# Patient Record
Sex: FEMALE | Race: WHITE | NOT HISPANIC OR LATINO | URBAN - NONMETROPOLITAN AREA
[De-identification: names, ages, dates, MRNs, and addresses within clinical notes are randomized per-mention and may not be internally consistent; named-entity substitution may affect disease eponyms.]

---

## 2017-11-08 RX ORDER — ALBUTEROL SULFATE 90 UG/1
2 AEROSOL, METERED RESPIRATORY (INHALATION) EVERY 4 HOURS PRN
COMMUNITY

## 2017-11-08 RX ORDER — AZELASTINE 1 MG/ML
2 SPRAY, METERED NASAL 2 TIMES DAILY
COMMUNITY

## 2017-11-08 RX ORDER — LANOLIN ALCOHOL/MO/W.PET/CERES
1000 CREAM (GRAM) TOPICAL DAILY
COMMUNITY

## 2017-11-08 RX ORDER — OMEPRAZOLE 20 MG/1
20 CAPSULE, DELAYED RELEASE ORAL DAILY
COMMUNITY

## 2017-11-08 RX ORDER — HYDROCODONE BITARTRATE AND ACETAMINOPHEN 5; 325 MG/1; MG/1
1 TABLET ORAL EVERY 6 HOURS PRN
COMMUNITY

## 2017-11-08 RX ORDER — POTASSIUM CHLORIDE 1.5 G/1.77G
20 POWDER, FOR SOLUTION ORAL 2 TIMES DAILY
COMMUNITY

## 2017-11-08 RX ORDER — FAMOTIDINE 40 MG/1
40 TABLET, FILM COATED ORAL DAILY
COMMUNITY

## 2017-11-08 RX ORDER — DOXEPIN HYDROCHLORIDE 75 MG/1
75 CAPSULE ORAL NIGHTLY
COMMUNITY

## 2017-11-08 RX ORDER — AMLODIPINE BESYLATE 5 MG/1
5 TABLET ORAL DAILY
COMMUNITY

## 2017-11-08 RX ORDER — CARVEDILOL 12.5 MG/1
12.5 TABLET ORAL DAILY
COMMUNITY

## 2017-11-08 RX ORDER — HYDROXYZINE HYDROCHLORIDE 25 MG/1
25 TABLET, FILM COATED ORAL 3 TIMES DAILY PRN
COMMUNITY

## 2017-11-08 RX ORDER — CLONAZEPAM 0.5 MG/1
0.5 TABLET ORAL 3 TIMES DAILY PRN
COMMUNITY

## 2017-11-08 RX ORDER — RANITIDINE 150 MG/1
150 TABLET ORAL 2 TIMES DAILY
COMMUNITY

## 2017-11-09 ENCOUNTER — OFFICE VISIT (OUTPATIENT)
Dept: NEUROSURGERY | Facility: CLINIC | Age: 57
End: 2017-11-09

## 2017-11-09 VITALS
OXYGEN SATURATION: 96 % | TEMPERATURE: 98.2 F | DIASTOLIC BLOOD PRESSURE: 88 MMHG | WEIGHT: 177 LBS | SYSTOLIC BLOOD PRESSURE: 132 MMHG | BODY MASS INDEX: 31.36 KG/M2 | HEART RATE: 72 BPM | RESPIRATION RATE: 16 BRPM | HEIGHT: 63 IN

## 2017-11-09 DIAGNOSIS — G89.29 CHRONIC BILATERAL LOW BACK PAIN WITHOUT SCIATICA: Primary | ICD-10-CM

## 2017-11-09 DIAGNOSIS — M47.817 LUMBOSACRAL SPONDYLOSIS WITHOUT MYELOPATHY: ICD-10-CM

## 2017-11-09 DIAGNOSIS — M54.50 CHRONIC BILATERAL LOW BACK PAIN WITHOUT SCIATICA: Primary | ICD-10-CM

## 2017-11-09 DIAGNOSIS — M48.061 STENOSIS OF LATERAL RECESS OF LUMBAR SPINE: ICD-10-CM

## 2017-11-09 PROCEDURE — 99204 OFFICE O/P NEW MOD 45 MIN: CPT | Performed by: NEUROLOGICAL SURGERY

## 2017-11-09 RX ORDER — HYDROCODONE BITARTRATE AND ACETAMINOPHEN 7.5; 325 MG/1; MG/1
1 TABLET ORAL EVERY 6 HOURS PRN
COMMUNITY

## 2017-11-09 RX ORDER — PROMETHAZINE HYDROCHLORIDE 25 MG/1
25 TABLET ORAL EVERY 6 HOURS PRN
COMMUNITY

## 2017-11-09 RX ORDER — NEBIVOLOL 5 MG/1
5 TABLET ORAL DAILY
COMMUNITY

## 2017-11-09 RX ORDER — HYDROCHLOROTHIAZIDE 25 MG/1
25 TABLET ORAL DAILY
COMMUNITY

## 2017-11-09 RX ORDER — FUROSEMIDE 20 MG/1
20 TABLET ORAL 2 TIMES DAILY
COMMUNITY

## 2017-11-09 NOTE — PROGRESS NOTES
Patient: Kiarra Rosales  :  1960  Chart #:  [id number]    Date of Service: 17    Chief Complaint:   Chief Complaint   Patient presents with   • Back Pain       Back Pain   Chronicity: Mrs. Rosales returns today with low back pain. The current episode started more than 1 month ago (I saw her back in  for neck and thoracic pain.  Patient presents today with low back pain.  This has been going on for the last 6 months.). Episode frequency: She has had a bilateral hip replacement. The problem has been gradually worsening (She has some stiffness in her upper extremities.) since onset. The pain is present in the lumbar spine (Patient's gait is abnormal.  She cannot hardly walk. She walks very slowly.). The quality of the pain is described as aching. The pain does not radiate. The pain is at a severity of 6/10. The pain is moderate (Her pain is moderate to severe.). Worse during: She has a tingling sensation in the left foot.  She has pain when her lower extremities are raised. The symptoms are aggravated by position and standing (Her pain increases when she is ambulatory.). Stiffness is present all day. Associated symptoms include abdominal pain and weakness. Pertinent negatives include no chest pain, dysuria, fever, headaches or numbness. Risk factors include lack of exercise and sedentary lifestyle. She has tried bed rest and analgesics (She is not able to take Ibuprofen because it hurts her stomach.) for the symptoms. The treatment provided no relief.     She denies leg pain;  She has constant low back pain; she cannot take NSAIDs due to stomach irritation.  She has tried PT without help.  She has never had spine surgery.      Radiographic Images:   MRI of the lumbar spine dated 17 shows she has normal lumbar alignment.  She has dessication of all her lumbar disc.  She has bulging at the L4-L5 disc.  She has mild lateral recess stenosis at L4 on L5 on the right.  There is no central spinal  stenosis or disc herniations.    Past Medical History:   Diagnosis Date   • Anxiety    • Chronic kidney disease     chronic - stage 3   • Colitis    • COPD (chronic obstructive pulmonary disease)    • Gastroenteritis    • H/O diarrhea    • Headache    • Heart disease     atherosclerotic   • Hyperglycemia    • Hypertension    • Hypokalemia    • Hypokalemia    • Low back pain    • LOUIS (nonalcoholic steatohepatitis)    • Osteoarthritis    • SOB (shortness of breath)    • Vitamin B12 deficiency      Current Outpatient Prescriptions   Medication Sig Dispense Refill   • clonazePAM (KlonoPIN) 0.5 MG tablet Take 0.5 mg by mouth 3 (Three) Times a Day As Needed for Seizures.     • doxepin (SINEquan) 75 MG capsule Take 75 mg by mouth Every Night.     • famotidine (PEPCID) 40 MG tablet Take 40 mg by mouth Daily.     • furosemide (LASIX) 20 MG tablet Take 20 mg by mouth 2 (Two) Times a Day.     • hydrochlorothiazide (HYDRODIURIL) 25 MG tablet Take 25 mg by mouth Daily.     • HYDROcodone-acetaminophen (NORCO) 7.5-325 MG per tablet Take 1 tablet by mouth Every 6 (Six) Hours As Needed for Moderate Pain .     • hydrOXYzine (ATARAX) 25 MG tablet Take 25 mg by mouth 3 (Three) Times a Day As Needed for Itching.     • nebivolol (BYSTOLIC) 5 MG tablet Take 5 mg by mouth Daily.     • promethazine (PHENERGAN) 25 MG tablet Take 25 mg by mouth Every 6 (Six) Hours As Needed for Nausea or Vomiting.     • raNITIdine (ZANTAC) 150 MG tablet Take 150 mg by mouth 2 (Two) Times a Day.     • albuterol (PROVENTIL HFA;VENTOLIN HFA) 108 (90 Base) MCG/ACT inhaler Inhale 2 puffs Every 4 (Four) Hours As Needed for Wheezing.     • amLODIPine (NORVASC) 5 MG tablet Take 5 mg by mouth Daily.     • azelastine (ASTELIN) 0.1 % nasal spray 2 sprays into each nostril 2 (Two) Times a Day. Use in each nostril as directed     • carvedilol (COREG) 12.5 MG tablet Take 12.5 mg by mouth Daily.     • HYDROcodone-acetaminophen (NORCO) 5-325 MG per tablet Take 1 tablet by  mouth Every 6 (Six) Hours As Needed.     • magnesium oxide (MAGOX) 400 (241.3 Mg) MG tablet tablet Take 400 mg by mouth Daily.     • omeprazole (priLOSEC) 20 MG capsule Take 20 mg by mouth Daily.     • potassium chloride (KLOR-CON) 20 MEQ packet Take 20 mEq by mouth 2 (Two) Times a Day.     • vitamin B-12 (CYANOCOBALAMIN) 1000 MCG tablet Take 1,000 mcg by mouth Daily.       No current facility-administered medications for this visit.       Allergies   Allergen Reactions   • Latex    • Lyrica [Pregabalin]    • Morphine And Related      Social History     Social History   • Marital status: Unknown     Spouse name: N/A   • Number of children: N/A   • Years of education: N/A     Social History Main Topics   • Smoking status: Current Every Day Smoker     Packs/day: 1.00   • Smokeless tobacco: Never Used   • Alcohol use No   • Drug use: No   • Sexual activity: Defer     Other Topics Concern   • None     Social History Narrative     Family History   Problem Relation Age of Onset   • Cancer Mother      Past Surgical History:   Procedure Laterality Date   •  SECTION     • CHOLECYSTECTOMY     • HIP SURGERY       Review of Systems   Constitutional: Positive for activity change, appetite change and unexpected weight change. Negative for chills, diaphoresis, fatigue and fever.   HENT: Negative for congestion, dental problem, drooling, ear discharge, ear pain, facial swelling, hearing loss, mouth sores, nosebleeds, postnasal drip, rhinorrhea, sinus pressure, sneezing, sore throat, tinnitus, trouble swallowing and voice change.    Eyes: Positive for photophobia. Negative for pain, discharge, redness, itching and visual disturbance.   Respiratory: Negative for apnea, cough, choking, chest tightness, shortness of breath, wheezing and stridor.    Cardiovascular: Positive for leg swelling. Negative for chest pain and palpitations.   Gastrointestinal: Positive for abdominal pain, diarrhea, nausea and vomiting. Negative for  "abdominal distention, anal bleeding, blood in stool, constipation and rectal pain.   Endocrine: Positive for polydipsia. Negative for cold intolerance, heat intolerance, polyphagia and polyuria.   Genitourinary: Negative for decreased urine volume, difficulty urinating, dysuria, enuresis, flank pain, frequency, genital sores, hematuria and urgency.   Musculoskeletal: Positive for back pain. Negative for arthralgias, gait problem, joint swelling, myalgias, neck pain and neck stiffness.   Skin: Negative for color change, pallor, rash and wound.   Allergic/Immunologic: Positive for food allergies. Negative for environmental allergies and immunocompromised state.   Neurological: Positive for weakness. Negative for dizziness, tremors, seizures, syncope, facial asymmetry, speech difficulty, light-headedness, numbness and headaches.   Hematological: Negative for adenopathy. Does not bruise/bleed easily.   Psychiatric/Behavioral: Positive for dysphoric mood. Negative for agitation, behavioral problems, confusion, decreased concentration, hallucinations, self-injury, sleep disturbance and suicidal ideas. The patient is nervous/anxious. The patient is not hyperactive.    All other systems reviewed and are negative.    Vitals:    11/09/17 1456   BP: 132/88   BP Location: Left arm   Patient Position: Sitting   Pulse: 72   Resp: 16   Temp: 98.2 °F (36.8 °C)   SpO2: 96%   Weight: 177 lb (80.3 kg)   Height: 63\" (160 cm)     Physical Exam   Constitutional: She is oriented to person, place, and time. She appears well-developed and well-nourished. No distress.   Neat healthy female   HENT:   Head: Normocephalic.   Right Ear: Hearing normal.   Left Ear: Hearing normal.   Mouth/Throat: Uvula is midline, oropharynx is clear and moist and mucous membranes are normal. She has dentures.   Eyes: Conjunctivae, EOM and lids are normal. Pupils are equal, round, and reactive to light.   Pupils 3 mm;  Fundi normal   Neck: Trachea normal. " Decreased range of motion present. No thyroid mass present.   Mild stiffness;  Shoulder ROM limited to 120 deg on R and 150 deg on L.     Cardiovascular: Regular rhythm.    Pulses:       Carotid pulses are 2+ on the right side, and 2+ on the left side.       Radial pulses are 2+ on the right side, and 2+ on the left side.        Dorsalis pedis pulses are 2+ on the right side, and 2+ on the left side.   Musculoskeletal:        Lumbar back: She exhibits decreased range of motion and pain. She exhibits no deformity and no spasm.   Moderate stiffness;  SLR increased low back pain R>L;  Hip ROM diminished bilaterally   Neurological: She is oriented to person, place, and time. She has normal strength.   Reflex Scores:       Tricep reflexes are 1+ on the right side and 1+ on the left side.       Bicep reflexes are 1+ on the right side and 1+ on the left side.       Patellar reflexes are 0 on the right side and 0 on the left side.       Achilles reflexes are 1+ on the right side and 1+ on the left side.  Psychiatric: Her speech is normal.     Neurologic Exam     Mental Status   Oriented to person, place, and time.   Attention: normal. Concentration: normal.   Speech: speech is normal   Level of consciousness: alert  Knowledge: good and consistent with education.   Normal comprehension.     Cranial Nerves   Cranial nerves II through XII intact.     CN III, IV, VI   Pupils are equal, round, and reactive to light.  Extraocular motions are normal.     Motor Exam   Muscle bulk: normal  Overall muscle tone: normal    Strength   Strength 5/5 throughout.     Sensory Exam   Right arm light touch: normal  Left arm light touch: normal  Right leg light touch: normal  Proprioception normal.   Sensory deficit distribution on left: L5    Gait, Coordination, and Reflexes     Gait  Gait: (stiff legged but not spastic)    Tremor   Resting tremor: absent  Intention tremor: absent  Action tremor: absent    Reflexes   Right biceps: 1+  Left  biceps: 1+  Right triceps: 1+  Left triceps: 1+  Right patellar: 0  Left patellar: 0  Right achilles: 1+  Left achilles: 1+  Right Rivero: absent  Left Rivero: absent  Right ankle clonus: absent  Left ankle clonus: absent       NATALIO normal;  Right handed       Kiarra was seen today for back pain.    Diagnoses and all orders for this visit:    Chronic bilateral low back pain without sciatica  -     Ambulatory Referral to Pain Management    Lumbosacral spondylosis without myelopathy  -     Ambulatory Referral to Pain Management    Stenosis of lateral recess of lumbar spine  Comments:  right L4-L5  Orders:  -     Ambulatory Referral to Pain Management      Plan: Refer to pain management for trial of lumbar ESIs.  Apply ice to low back prn;  Daily exercise;  I do not see an indication for spine surgery at this time.  I have discussed this with the patient.  I will see her again prn if she has more trouble with her back or develops new symptoms.      I, Dr. Mandeep Salamanca, personally performed the services described in the documentation as scribed in my presence, and the documentation is both accurate and complete.    Mandeep Salamanca MD   Scribed for Mandeep Salamanca MD by Petty Gonzales, NAN @. 11/9/2017  3:47 PM

## 2018-01-26 ENCOUNTER — TELEPHONE (OUTPATIENT)
Dept: NEUROSURGERY | Facility: CLINIC | Age: 58
End: 2018-01-26

## 2018-02-02 NOTE — TELEPHONE ENCOUNTER
Spoke with patient about her options of getting in to see Dr. Salamanca at his first available or sending another referral to another pain management clinic.  Pt has chosen to see Dr. Salamanca in [place] in March.

## 2018-03-22 ENCOUNTER — OFFICE VISIT (OUTPATIENT)
Dept: NEUROSURGERY | Facility: CLINIC | Age: 58
End: 2018-03-22

## 2018-03-22 VITALS
OXYGEN SATURATION: 98 % | HEIGHT: 63 IN | BODY MASS INDEX: 31.89 KG/M2 | SYSTOLIC BLOOD PRESSURE: 132 MMHG | TEMPERATURE: 98.2 F | WEIGHT: 180 LBS | RESPIRATION RATE: 16 BRPM | DIASTOLIC BLOOD PRESSURE: 80 MMHG | HEART RATE: 68 BPM

## 2018-03-22 DIAGNOSIS — M47.817 LUMBOSACRAL SPONDYLOSIS WITHOUT MYELOPATHY: ICD-10-CM

## 2018-03-22 DIAGNOSIS — M48.061 STENOSIS OF LATERAL RECESS OF LUMBAR SPINE: ICD-10-CM

## 2018-03-22 DIAGNOSIS — M54.50 CHRONIC BILATERAL LOW BACK PAIN WITHOUT SCIATICA: Primary | ICD-10-CM

## 2018-03-22 DIAGNOSIS — G89.29 CHRONIC BILATERAL LOW BACK PAIN WITHOUT SCIATICA: Primary | ICD-10-CM

## 2018-03-22 PROCEDURE — 99213 OFFICE O/P EST LOW 20 MIN: CPT | Performed by: NEUROLOGICAL SURGERY

## 2018-03-22 RX ORDER — NITROGLYCERIN 2.5 MG/1
2.5 CAPSULE ORAL 3 TIMES DAILY
COMMUNITY

## 2018-03-22 NOTE — PROGRESS NOTES
Patient: Kiarra Rosales  :  1960  Chart #:  [id number]    Date of Service: 18    Chief Complaint:   Chief Complaint   Patient presents with   • Follow-up       Back Pain   Chronicity: Mrs. Rosales returns today for a follow-up on her back pain. The current episode started more than 1 month ago (I saw her in my office on 17 for cervical and thoracic pain.). The problem occurs constantly (She has had pain for about 6 months now.). The problem is unchanged. The pain is present in the lumbar spine. The pain is at a severity of 5/10. The pain is mild. The symptoms are aggravated by position. Stiffness is present in the morning. Associated symptoms include chest pain. Risk factors include lack of exercise and sedentary lifestyle. She has tried bed rest, analgesics, heat and ice for the symptoms. The treatment provided mild relief.   She denies leg pain;  She has constant low back pain; she cannot take NSAIDs due to stomach irritation.  She has tried PT without help.  She has never had spine surgery.       Radiographic Images:   MRI of the lumbar spine dated 17 shows she has normal lumbar alignment.  She has dessication of all her lumbar disc.  She has bulging at the L4-L5 disc.  She has mild lateral recess stenosis at L4 on L5 on the right.  There is no central spinal stenosis or disc herniations.     Past Medical History:   Diagnosis Date   • Anxiety    • Chronic kidney disease     chronic - stage 3   • Colitis    • COPD (chronic obstructive pulmonary disease)    • Gastroenteritis    • H/O diarrhea    • Headache    • Heart disease     atherosclerotic   • Hyperglycemia    • Hypertension    • Hypokalemia    • Hypokalemia    • Low back pain    • LOUIS (nonalcoholic steatohepatitis)    • Osteoarthritis    • SOB (shortness of breath)    • Vitamin B12 deficiency      Current Outpatient Prescriptions   Medication Sig Dispense Refill   • nitroglycerin (NITRO-BID) 2.5 MG CR capsule Take 2.5 mg by mouth  3 (Three) Times a Day.     • albuterol (PROVENTIL HFA;VENTOLIN HFA) 108 (90 Base) MCG/ACT inhaler Inhale 2 puffs Every 4 (Four) Hours As Needed for Wheezing.     • amLODIPine (NORVASC) 5 MG tablet Take 5 mg by mouth Daily.     • azelastine (ASTELIN) 0.1 % nasal spray 2 sprays into each nostril 2 (Two) Times a Day. Use in each nostril as directed     • carvedilol (COREG) 12.5 MG tablet Take 12.5 mg by mouth Daily.     • clonazePAM (KlonoPIN) 0.5 MG tablet Take 0.5 mg by mouth 3 (Three) Times a Day As Needed for Seizures.     • doxepin (SINEquan) 75 MG capsule Take 75 mg by mouth Every Night.     • famotidine (PEPCID) 40 MG tablet Take 40 mg by mouth Daily.     • furosemide (LASIX) 20 MG tablet Take 20 mg by mouth 2 (Two) Times a Day.     • hydrochlorothiazide (HYDRODIURIL) 25 MG tablet Take 25 mg by mouth Daily.     • HYDROcodone-acetaminophen (NORCO) 5-325 MG per tablet Take 1 tablet by mouth Every 6 (Six) Hours As Needed.     • HYDROcodone-acetaminophen (NORCO) 7.5-325 MG per tablet Take 1 tablet by mouth Every 6 (Six) Hours As Needed for Moderate Pain .     • hydrOXYzine (ATARAX) 25 MG tablet Take 25 mg by mouth 3 (Three) Times a Day As Needed for Itching.     • magnesium oxide (MAGOX) 400 (241.3 Mg) MG tablet tablet Take 400 mg by mouth Daily.     • nebivolol (BYSTOLIC) 5 MG tablet Take 5 mg by mouth Daily.     • omeprazole (priLOSEC) 20 MG capsule Take 20 mg by mouth Daily.     • potassium chloride (KLOR-CON) 20 MEQ packet Take 20 mEq by mouth 2 (Two) Times a Day.     • promethazine (PHENERGAN) 25 MG tablet Take 25 mg by mouth Every 6 (Six) Hours As Needed for Nausea or Vomiting.     • raNITIdine (ZANTAC) 150 MG tablet Take 150 mg by mouth 2 (Two) Times a Day.     • vitamin B-12 (CYANOCOBALAMIN) 1000 MCG tablet Take 1,000 mcg by mouth Daily.       No current facility-administered medications for this visit.       Allergies   Allergen Reactions   • Latex    • Lyrica [Pregabalin]    • Morphine And Related   "    Social History     Social History   • Marital status: Unknown     Social History Main Topics   • Smoking status: Current Every Day Smoker     Packs/day: 1.00   • Smokeless tobacco: Never Used   • Alcohol use No   • Drug use: No   • Sexual activity: Defer     Other Topics Concern   • Not on file     Family History   Problem Relation Age of Onset   • Cancer Mother      Past Surgical History:   Procedure Laterality Date   • BREAST CYST EXCISION  2017   •  SECTION     • CHOLECYSTECTOMY     • HIP SURGERY       Review of Systems   Constitutional: Positive for fatigue and unexpected weight change.   Eyes: Positive for photophobia.   Cardiovascular: Positive for chest pain and leg swelling.   Gastrointestinal: Positive for diarrhea, nausea and vomiting.   Musculoskeletal: Positive for back pain.   Allergic/Immunologic: Positive for environmental allergies.   Psychiatric/Behavioral: Positive for dysphoric mood. The patient is nervous/anxious.    All other systems reviewed and are negative.    Vitals:    18 1359   BP: 132/80   BP Location: Left arm   Patient Position: Sitting   Pulse: 68   Resp: 16   Temp: 98.2 °F (36.8 °C)   SpO2: 98%   Weight: 81.6 kg (180 lb)   Height: 160 cm (63\")     Physical Exam  Neurologic Exam  Constitutional: She is oriented to person, place, and time. She appears well-developed and well-nourished. No distress.   Neat healthy female   Neck: Trachea normal. Decreased range of motion present. No thyroid mass present.   Mild stiffness;  Shoulder ROM limited to 120 deg on R and 150 deg on L.     Cardiovascular: Regular rhythm.    Pulses:       Carotid pulses are 2+ on the right side, and 2+ on the left side.       Radial pulses are 2+ on the right side, and 2+ on the left side.        Dorsalis pedis pulses are 2+ on the right side, and 2+ on the left side.   Musculoskeletal:        Lumbar back: She exhibits decreased range of motion and pain. She exhibits no deformity and no spasm. "   Moderate stiffness;  SLR increased low back pain R>L;  Hip ROM diminished bilaterally      Neurologic Exam      Mental Status   Oriented to person, place, and time.   Attention: normal. Concentration: normal.   Speech: speech is normal   Level of consciousness: alert  Knowledge: good and consistent with education.   Normal comprehension.      Cranial Nerves   Cranial nerves II through XII intact.      Motor Exam   Muscle bulk: normal  Overall muscle tone: normal     Strength   Strength 5/5 throughout.      Sensory Exam   Right arm light touch: normal  Left arm light touch: normal  Right leg light touch: normal  Proprioception normal.   Sensory deficit distribution on left: L5     Gait, Coordination, and Reflexes      Gait  Gait: (stiff legged but not spastic)     Tremor   Resting tremor: absent  Intention tremor: absent  Action tremor: absent     Reflexes   Right biceps: 1+  Left biceps: 1+  Right triceps: 1+  Left triceps: 1+  Right patellar: 0  Left patellar: 0  Right achilles: 1+  Left achilles: 1+  Right Rivero: absent  Left Rivero: absent  Right ankle clonus: absent  Left ankle clonus: absent       NATALIO normal;  Right handed      Kiarra was seen today for follow-up.    Diagnoses and all orders for this visit:    Chronic bilateral low back pain without sciatica  -     Ambulatory Referral to Pain Management    Lumbosacral spondylosis without myelopathy  -     Ambulatory Referral to Pain Management    Stenosis of lateral recess of lumbar spine  -     Ambulatory Referral to Pain Management      Plan:  Refer to Dr. Sara Su for evaluation and pain management.  I do not see an indication for spine surgery at this time.  I will see her again prn if she develops new neurological symptoms.    I, Dr. Mandeep Salamanca, personally performed the services described in the documentation as scribed in my presence, and the documentation is both accurate and complete.    Mandeep Salamanca MD

## 2018-07-03 ENCOUNTER — OFFICE VISIT (OUTPATIENT)
Dept: UROLOGY | Facility: CLINIC | Age: 58
End: 2018-07-03

## 2018-07-03 VITALS — HEIGHT: 63 IN | WEIGHT: 180 LBS | BODY MASS INDEX: 31.89 KG/M2

## 2018-07-03 DIAGNOSIS — N39.0 URINARY TRACT INFECTION WITHOUT HEMATURIA, SITE UNSPECIFIED: Primary | ICD-10-CM

## 2018-07-03 DIAGNOSIS — R33.9 URINARY RETENTION: ICD-10-CM

## 2018-07-03 LAB
BILIRUB BLD-MCNC: NEGATIVE MG/DL
CLARITY, POC: CLEAR
COLOR UR: YELLOW
GLUCOSE UR STRIP-MCNC: NEGATIVE MG/DL
KETONES UR QL: NEGATIVE
LEUKOCYTE EST, POC: NEGATIVE
NITRITE UR-MCNC: NEGATIVE MG/ML
PH UR: 6.5 [PH] (ref 5–8)
PROT UR STRIP-MCNC: NEGATIVE MG/DL
RBC # UR STRIP: NEGATIVE /UL
SP GR UR: 1.01 (ref 1–1.03)
UROBILINOGEN UR QL: NORMAL

## 2018-07-03 PROCEDURE — 99202 OFFICE O/P NEW SF 15 MIN: CPT | Performed by: UROLOGY

## 2018-07-03 PROCEDURE — 81003 URINALYSIS AUTO W/O SCOPE: CPT | Performed by: UROLOGY

## 2018-07-03 NOTE — PROGRESS NOTES
Chief Complaint:          Chief Complaint   Patient presents with   • Urinary Retention       HPI:   57 y.o. female.  37-year-old white female who is [place] mother-in-law presents with difficulty urinating and a question of urinary retention she feels swollen, she has frequency and urgency, she dribbles, sheets of 3 times at night, her mother had bladder problems as well she has every other day bowel movements.  Negative urinalysis prior cholecystectomy and hysterectomy.  She is a  2 para 2.  She refused any type of examination or scan.  I explained to her that I be completely unable to help her in the absence of the examination she is comfortable with this she is very shy we'll see her back in an as-needed basis she was offered any type of intervention she would like after examination    Past Medical History:        Past Medical History:   Diagnosis Date   • Anxiety    • Chronic kidney disease     chronic - stage 3   • Colitis    • COPD (chronic obstructive pulmonary disease) (CMS/[facility])    • Gastroenteritis    • H/O diarrhea    • Headache    • Heart disease     atherosclerotic   • Hyperglycemia    • Hypertension    • Hypokalemia    • Hypokalemia    • Low back pain    • LOUIS (nonalcoholic steatohepatitis)    • Osteoarthritis    • SOB (shortness of breath)    • Vitamin B12 deficiency          Current Meds:     Current Outpatient Prescriptions   Medication Sig Dispense Refill   • albuterol (PROVENTIL HFA;VENTOLIN HFA) 108 (90 Base) MCG/ACT inhaler Inhale 2 puffs Every 4 (Four) Hours As Needed for Wheezing.     • amLODIPine (NORVASC) 5 MG tablet Take 5 mg by mouth Daily.     • azelastine (ASTELIN) 0.1 % nasal spray 2 sprays into each nostril 2 (Two) Times a Day. Use in each nostril as directed     • carvedilol (COREG) 12.5 MG tablet Take 12.5 mg by mouth Daily.     • clonazePAM (KlonoPIN) 0.5 MG tablet Take 0.5 mg by mouth 3 (Three) Times a Day As Needed for Seizures.     • doxepin (SINEquan) 75 MG capsule  Take 75 mg by mouth Every Night.     • famotidine (PEPCID) 40 MG tablet Take 40 mg by mouth Daily.     • furosemide (LASIX) 20 MG tablet Take 20 mg by mouth 2 (Two) Times a Day.     • hydrochlorothiazide (HYDRODIURIL) 25 MG tablet Take 25 mg by mouth Daily.     • HYDROcodone-acetaminophen (NORCO) 5-325 MG per tablet Take 1 tablet by mouth Every 6 (Six) Hours As Needed.     • HYDROcodone-acetaminophen (NORCO) 7.5-325 MG per tablet Take 1 tablet by mouth Every 6 (Six) Hours As Needed for Moderate Pain .     • hydrOXYzine (ATARAX) 25 MG tablet Take 25 mg by mouth 3 (Three) Times a Day As Needed for Itching.     • magnesium oxide (MAGOX) 400 (241.3 Mg) MG tablet tablet Take 400 mg by mouth Daily.     • nebivolol (BYSTOLIC) 5 MG tablet Take 5 mg by mouth Daily.     • nitroglycerin (NITRO-BID) 2.5 MG CR capsule Take 2.5 mg by mouth 3 (Three) Times a Day.     • omeprazole (priLOSEC) 20 MG capsule Take 20 mg by mouth Daily.     • potassium chloride (KLOR-CON) 20 MEQ packet Take 20 mEq by mouth 2 (Two) Times a Day.     • promethazine (PHENERGAN) 25 MG tablet Take 25 mg by mouth Every 6 (Six) Hours As Needed for Nausea or Vomiting.     • raNITIdine (ZANTAC) 150 MG tablet Take 150 mg by mouth 2 (Two) Times a Day.     • vitamin B-12 (CYANOCOBALAMIN) 1000 MCG tablet Take 1,000 mcg by mouth Daily.       No current facility-administered medications for this visit.         Allergies:      Allergies   Allergen Reactions   • Latex    • Lyrica [Pregabalin]    • Morphine And Related         Past Surgical History:     Past Surgical History:   Procedure Laterality Date   • BREAST CYST EXCISION  2017   •  SECTION     • CHOLECYSTECTOMY     • HIP SURGERY           Social History:     Social History     Social History   • Marital status: Unknown     Spouse name: N/A   • Number of children: N/A   • Years of education: N/A     Occupational History   • Not on file.     Social History Main Topics   • Smoking status: Current Every Day  Smoker     Packs/day: 1.00   • Smokeless tobacco: Never Used   • Alcohol use No   • Drug use: No   • Sexual activity: Defer     Other Topics Concern   • Not on file     Social History Narrative   • No narrative on file       Family History:     Family History   Problem Relation Age of Onset   • Cancer Mother        Review of Systems:     Review of Systems   Constitutional: Negative.  Negative for activity change, appetite change, chills, diaphoresis, fatigue and unexpected weight change.   HENT: Negative for congestion, dental problem, drooling, ear discharge, ear pain, facial swelling, hearing loss, mouth sores, nosebleeds, postnasal drip, rhinorrhea, sinus pressure, sneezing, sore throat, tinnitus, trouble swallowing and voice change.    Eyes: Negative.  Negative for photophobia, pain, discharge, redness, itching and visual disturbance.   Respiratory: Negative.  Negative for apnea, cough, choking, chest tightness, shortness of breath, wheezing and stridor.    Cardiovascular: Negative.  Negative for chest pain, palpitations and leg swelling.   Gastrointestinal: Negative.  Negative for abdominal distention, abdominal pain, anal bleeding, blood in stool, constipation, diarrhea, nausea, rectal pain and vomiting.   Endocrine: Negative.  Negative for cold intolerance, heat intolerance, polydipsia, polyphagia and polyuria.   Genitourinary: Positive for difficulty urinating.   Musculoskeletal: Negative.  Negative for arthralgias, back pain, gait problem, joint swelling, myalgias, neck pain and neck stiffness.   Skin: Negative.  Negative for color change, pallor, rash and wound.   Allergic/Immunologic: Negative.  Negative for environmental allergies, food allergies and immunocompromised state.   Neurological: Negative.  Negative for dizziness, tremors, seizures, syncope, facial asymmetry, speech difficulty, weakness, light-headedness, numbness and headaches.   Hematological: Negative.  Negative for adenopathy. Does not  bruise/bleed easily.   Psychiatric/Behavioral: Negative for agitation, behavioral problems, confusion, decreased concentration, dysphoric mood, hallucinations, self-injury, sleep disturbance and suicidal ideas. The patient is not nervous/anxious and is not hyperactive.    All other systems reviewed and are negative.      Physical Exam:     Physical Exam   Constitutional: She appears well-developed and well-nourished.   HENT:   Head: Normocephalic and atraumatic.   Right Ear: External ear normal.   Left Ear: External ear normal.   Mouth/Throat: Oropharynx is clear and moist.   Eyes: Conjunctivae are normal. Pupils are equal, round, and reactive to light.   Cardiovascular: Normal rate, regular rhythm, normal heart sounds and intact distal pulses.    Pulmonary/Chest: Effort normal and breath sounds normal.   Abdominal: Soft. Bowel sounds are normal. She exhibits no distension and no mass. There is no tenderness. There is no rebound and no guarding.   Genitourinary: No vaginal discharge found.   Musculoskeletal: Normal range of motion.   Neurological: She is alert. She has normal reflexes.   Skin: Skin is warm and dry.   Psychiatric: She has a normal mood and affect. Her behavior is normal. Judgment and thought content normal.       I have reviewed the following portions of the patient's history: allergies, current medications, past family history, past medical history, past social history, past surgical history, problem list and ROS and confirm it's accurate.      Procedure:       Assessment/Plan:   Urinary retention-I have no specific diagnosis because of her complete lack of interest in an examination are scanned I offered her to come back at any time a follow-up with her based on this     Patient's Body mass index is 31.89 kg/m². BMI is above normal parameters. Recommendations include: educational material.          This document has been electronically signed by FOREIGN RUSSO MD July 3, 2018 2:22 PM

## 2018-07-05 PROBLEM — R33.9 URINARY RETENTION: Status: ACTIVE | Noted: 2018-07-05

## 2022-01-18 ENCOUNTER — TRANSCRIBE ORDERS (OUTPATIENT)
Dept: ADMINISTRATIVE | Facility: HOSPITAL | Age: 62
End: 2022-01-18

## 2022-01-18 DIAGNOSIS — Z01.818 PRE-OPERATIVE CLEARANCE: Primary | ICD-10-CM
